# Patient Record
Sex: FEMALE | Race: WHITE | URBAN - METROPOLITAN AREA
[De-identification: names, ages, dates, MRNs, and addresses within clinical notes are randomized per-mention and may not be internally consistent; named-entity substitution may affect disease eponyms.]

---

## 2018-10-16 ENCOUNTER — TRANSFERRED RECORDS (OUTPATIENT)
Dept: HEALTH INFORMATION MANAGEMENT | Facility: CLINIC | Age: 83
End: 2018-10-16

## 2018-10-17 ENCOUNTER — TRANSFERRED RECORDS (OUTPATIENT)
Dept: HEALTH INFORMATION MANAGEMENT | Facility: CLINIC | Age: 83
End: 2018-10-17

## 2018-10-23 ENCOUNTER — TELEPHONE (OUTPATIENT)
Dept: GASTROENTEROLOGY | Facility: CLINIC | Age: 83
End: 2018-10-23

## 2018-10-23 NOTE — TELEPHONE ENCOUNTER
Called out to patient, received message requesting entering access code, unable to leave reminder for appointment 10/29/18 at 10AM.

## 2018-10-26 ENCOUNTER — DOCUMENTATION ONLY (OUTPATIENT)
Dept: GASTROENTEROLOGY | Facility: CLINIC | Age: 83
End: 2018-10-26

## 2018-10-28 ENCOUNTER — MEDICAL CORRESPONDENCE (OUTPATIENT)
Dept: HEALTH INFORMATION MANAGEMENT | Facility: CLINIC | Age: 83
End: 2018-10-28

## 2018-10-29 ENCOUNTER — OFFICE VISIT (OUTPATIENT)
Dept: GASTROENTEROLOGY | Facility: CLINIC | Age: 83
End: 2018-10-29
Payer: COMMERCIAL

## 2018-10-29 VITALS
TEMPERATURE: 97.7 F | SYSTOLIC BLOOD PRESSURE: 142 MMHG | DIASTOLIC BLOOD PRESSURE: 60 MMHG | OXYGEN SATURATION: 98 % | HEART RATE: 70 BPM

## 2018-10-29 DIAGNOSIS — R10.13 DYSPEPSIA: Primary | ICD-10-CM

## 2018-10-29 DIAGNOSIS — K21.9 GASTROESOPHAGEAL REFLUX DISEASE, ESOPHAGITIS PRESENCE NOT SPECIFIED: ICD-10-CM

## 2018-10-29 DIAGNOSIS — N18.30 CKD (CHRONIC KIDNEY DISEASE) STAGE 3, GFR 30-59 ML/MIN (H): ICD-10-CM

## 2018-10-29 DIAGNOSIS — R60.0 BILATERAL LOWER EXTREMITY EDEMA: ICD-10-CM

## 2018-10-29 RX ORDER — NICOTINE POLACRILEX 4 MG/1
40 GUM, CHEWING ORAL 2 TIMES DAILY
Qty: 60 TABLET | Refills: 1 | Status: SHIPPED | OUTPATIENT
Start: 2018-10-29 | End: 2018-11-05

## 2018-10-29 RX ORDER — NICOTINE POLACRILEX 4 MG/1
40 GUM, CHEWING ORAL DAILY
Qty: 90 TABLET | Refills: 3 | Status: SHIPPED | OUTPATIENT
Start: 2018-12-17 | End: 2018-11-05

## 2018-10-29 ASSESSMENT — ENCOUNTER SYMPTOMS
SLEEP DISTURBANCES DUE TO BREATHING: 0
VOMITING: 1
ORTHOPNEA: 1
BACK PAIN: 0
NAUSEA: 1
SWOLLEN GLANDS: 0
SYNCOPE: 0
BOWEL INCONTINENCE: 0
MUSCLE WEAKNESS: 0
MUSCLE CRAMPS: 0
PALPITATIONS: 0
BLOOD IN STOOL: 0
BLOATING: 0
BRUISES/BLEEDS EASILY: 0
STIFFNESS: 1
ARTHRALGIAS: 1
JAUNDICE: 0
HYPOTENSION: 0
EXERCISE INTOLERANCE: 1
JOINT SWELLING: 0
ABDOMINAL PAIN: 1
CONSTIPATION: 0
MYALGIAS: 0
HYPERTENSION: 1
LEG PAIN: 1
HEARTBURN: 1
RECTAL PAIN: 0
NECK PAIN: 0
LIGHT-HEADEDNESS: 0
DIARRHEA: 0

## 2018-10-29 ASSESSMENT — PAIN SCALES - GENERAL: PAINLEVEL: NO PAIN (0)

## 2018-10-29 NOTE — PATIENT INSTRUCTIONS
I've included a brief summary of our discussion and care plan from today's visit below.  Please review this information with your primary care provider.  _______________________________________________________________________      1. It was wonderful getting a chance to meet with you to discuss your Chronic (likely multifactorial) Abdominal Pain in the complicated post-surgical setting, particularly given the recent initiation of narcotic pain medication (Fentanyl patch) which has helped - discussed next steps in evaluation and management together today.  - Given the improvement in your symptoms with use of as-needed Maalox (currently holding off use due to diarrhea) and low-dose PPI per the Nursing Home notes, would advise increasing your PPI (omeprazole) dosage as I suspect an element of Dyspepsia +/- PUD at play. Would advise trying omeprazole 40mg, taken twice daily, for 8 weeks and then reducing to 40mg daily thereafter. This might help to reduce some of the dependence on Maalox. Recommend daily multivitamin supplementation if long-term acid suppression is required.  - Given your most recent CBC results from 10/16/18, I am unsure as to why you are on oral iron replacement (scant normocytic anemia in setting of multiple medical comorbidities including CKD). This can sometimes compound bloating/discomfort in setting of concurrent use of narcotics. Please discuss discontinuation of oral iron replacement for now with your care team, with a repeat CBC in 8 weeks to assess for interval recurrent ZAIDA (may resume oral replacement if so, or if needed in conjunction with CKD-associated EPO therapy).  - If your symptoms improve with adjustment in PPI, would advise working to taper off narcotic pain medication if at all possible (and would prefer your team use non-narcotic therapies if at all possible).     2. I am very concerned with the pattern of breathing and increased bilateral leg edema you've been experiencing.  -  Recommend follow-up with Cardiology (last seen in 2012) for evaluation, suspect some element of progression of small-vessel CAD at play. Would strongly advise consideration for repeat TTE to assess current cardiac function. May need to consider further pulmonary evaluation as well depending on findings.  - Would also advise follow-up with Nephrology (last seen in 2012) given the underlying CKD and worsened bilateral leg edema, currently on very low-dose Lasix.  - Recommend Lymphedema Clinic referral to help with symptomatic management of your bilateral leg edema in the meantime, particularly given the current issues with significant uncomfortable beaded compression with your current stocking (risk for discomfort/skin tearing).  - Would recommend symptomatic management for suspected Dyspepsia +/- PUD in the meantime, hold off endoscopic evaluation until other active medical issues are stabilized. Can readdress role for endoscopic evaluations in ongoing care.    3. Evidence to suggest underlying chronic liver disease (assuming liver nodularity without overt mass on non-contrast outside CT scan 10/17/18, mild thrombocytopenia on CBC profile), and likely this is not a new phenomenon.  - Recommend obtaining a Complete Abdominal Ultrasound + Doppler study through your home institution to confirm, and to rule out a liver mass/ascites.  - May require consideration for referral to [facility] Hepatology for ongoing chronic liver disease management, particularly as chronic liver disease could also be playing a role in your worsening leg edema.    4. Continue to monitor for the danger signs/symptoms we reviewed together today:  worsening abdominal pain, worsening diarrhea, obstructive symptoms (nausea/vomiting, abdominal distention, inability to pass stool/flatus), blood mixed into stools, persistent fevers/chills, progressive anemia (particularly with iron deficiency), difficulty with swallowing, perianal/rectal discomfort  (particularly with defecation), unexpected weight loss, etc.  - Contact us via [employer] or phone should these symptoms occur, particularly as we may advise further evaluation accordingly.    5. Return to GI Clinic with my GI Physician Assistants (Roberto or Meghna) in 3-4 months to review your progress, sooner if symptomatic.   - If you are unable to schedule this follow-up appointment today, please contact our  at [phone] within the next week to help set up this necessary appointment.    _______________________________________________________________________    It was a pleasure seeing you in clinic today - please be in touch if there are any further questions that arise following today's visit.  During business hours, you may reach Clinic Nurse Triage Line at [phone].  For urgent/emergent questions after business hours, you may reach the on-call GI Fellow by contacting the [facility]  at [phone].    Any benign/non-urgent test results are usually communicated via letter or [employer] message within 1-2 weeks after completion.  Urgent results (those that require a change in the previously-discussed care plan) are usually communicated via a phone call once available from our clinic staff to discuss the results and the next steps in your evaluation.    I recommend signing up for [employer] access if you have not already done so and are comfortable with using a computer.  This allows for online access to your lab results and also helps you communicate efficiently with my clinic should any questions arise in your care.    We have Financial Counseling services available through our clinic.  If you have questions about your insurance coverage or payment responsibilities, particularly before you undergo any tests or procedures, please let us know and we can arrange a consultation accordingly to help you make informed decisions about your healthcare.    Sincerely,    Etienne FOUNTAIN  MD Rishabh    [facility] - Department of Medicine  Division of Gastroenterology

## 2018-10-29 NOTE — NURSING NOTE
Chief Complaint   Patient presents with     New Patient     new patient consult, abdominal pain       Vitals:    10/29/18 1013   BP: 142/60   Pulse: 70   Temp: 97.7  F (36.5  C)   TempSrc: Oral   SpO2: 98%       There is no height or weight on file to calculate BMI.    ROLY Mcintosh

## 2018-10-29 NOTE — LETTER
"10/29/2018       RE: Maryse Cruz  [address]  [place] [address]     Dear Colleague,    Thank you for referring your patient, Maryse Cruz, to the [facility] at [facility]. Please see a copy of my visit note below.    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 History is obtained through the patient and her two daughters. Patient was seen by outside provider for this complaint in which she had a CT scan notable for cirrhosis, and so acute changes of esophagus or intestine. She had EGD planned which was cancelled due to medical co-morbidities with plan for evaluation through [facility]. She reports abdominal pain described as a \"nervous stomach\" in her upper abdomen beginning about a month ago. Pain is improved with eating however is worsened with greasy foods. She had been taking Maalox through her nursing home, and used a fentanyl patch which improved her symptoms. She was also started on 20 mg PPI daily and is unclear if this affected her symptoms. She has had a couple of episodes of vomiting, most recently twice yesterday (once after eating, and once in the evening). Her daughters also note increased gas in ostomy and describes output as normal green/brown and soft. Notes increased swelling in legs the past month. Also has difficulty breathing. She denies heart-burn, hematochezia, melena, hematemesis. Reports having 2-3 appliance empties/day, typically w/ brown soft stool, no evidence of melena/BRB via ostomy. Denies any tenesmus or insecurity passing flatus, no fecal incontinence or " new perianal/nocturnal sxs noted. Denies any N/V/F/C/HA/NS or other const/syst/cardiopulmonary sxs, no urinary changes, no unintentional wt loss or appetite/satiety changes. No other bowel/bladder habit changes, no dysphagia/odynophagia. No jaundice/icterus/pruritus, no acholic stools/steatorrhea, no new lumps/bumps, no jt pain/oral ulcer/rash/eye sxs noted.    ROS: 10pt ROS performed and otherwise negative.    PERTINENT PAST MEDICAL HISTORY:  As noted above.    PREVIOUS ABDOMINAL/GYNECOLOGIC SURGERIES:  Per HPI    PREVIOUS ENDOSCOPY:  Last endoscopy 2009- normal esophagus, stomach and duodenum     PERTINENT MEDICATIONS:  Omeprazole 20 mg daily   Medications reviewed with patient today, see Medication List/Assessment for details.  No other NSAID/anticoagulation reported by patient.  No other OTC/herbal/supplements reported by patient.    SOCIAL HISTORY:  Lives in a nursing home, no smoking, drinking or drug use     FAMILY HISTORY:  Mother with CRC, Father with Liver cancer, uncle with colitis     PHYSICAL EXAMINATION:  Vitals reviewed, AFVSS  Wt- not obtained, patient in wheelchair   Gen: aaox3, cooperative, pleasant, not diaphoretic, nad  HEENT: ncat, neck supple, no clad/sclad, normal op w/o ulcer/exudate, anicteric, mmm  Resp/CV labored breathing, use of accessory muscles for breathing   Abd: midline hernia and dahiana-ostomy hernia, ostomy located left mid-abdomen, areas of firm abdomen, slightly tender to palpation   Ext: 2-3+ non-tender pitting edema in legs, no significant chronic venous stasis changes  Skin: warm, perfused, no jaundice  Neuro: grossly intact, no asterixis noted    PERTINENT STUDIES:  Lytes/LFT wnl, cr 1.6,   wbc 7.3, hgb 11.1, plt 123, mcv 97   Alk Phos 156 H   Calcium 9.2, Magnesium 2.2     ASSESSMENT/PLAN:    1. Upper Abdominal Pain in the setting of complicated abdominal surgery history and medical co-morbidities   Source of abdominal pain likely multifactorial and may be caused by GERD,  PUD, or functional dyspepsia.  Most recent labs through outside clinic notable for decreased HgB at 11/1 and normal MCV. CT scan did not show changes concerning for active Crohn's disease or source of abdominal pain however she was found to have cirrhosis. Symptoms improved with Maalox and improves with eating. She has been stated on PPI therapy, which will be optimized with omeprazole 40 mg BID for 8 weeks with plan to reduce to 40 mg after. She has complicated medical commodities including CAD and HTN in addition to CKD which would make EGD to evaluate for source of abdominal pain difficult. We recommend that the patient follows with nephrology and cardiology regarding other health issues as  she has not been seen by them since 2012. She also has significant LE for which we recommend lymphedema. Regarding finding of cirrhosis on CT scan, we would recommend a complete abdominal ultrasound and doppler study with possible referral to our hepatology clinic pending results.  Pending response of symptoms to PPI and evaluation by nephrology and cardiology, could consider EGD at follow-up visit.      Cancer Screening: with change in symptoms due to complicated medical commodities and age     RTC 3-4 months, sooner if symptomatic.    Thank you for this consultation. It was a pleasure to participate in the care of this patient; please contact us with any further questions.    Meghna Comer PA-C  Division of Gastroenterology, Hepatology & Nutrition  [facility]    ATTENDING ATTESTATION:    REFERRING PROVIDER: Self-referred  DATE SEEN: 10/29/18    1. It was wonderful getting a chance to meet with you to discuss your Chronic (likely multifactorial) Abdominal Pain in the complicated post-surgical setting, particularly given the recent initiation of narcotic pain medication (Fentanyl patch) which has helped - discussed next steps in evaluation and management together today.  - Given the improvement in your  symptoms with use of as-needed Maalox (currently holding off use due to diarrhea) and low-dose PPI per the Nursing Home notes, would advise increasing your PPI (omeprazole) dosage as I suspect an element of Dyspepsia +/- PUD at play. Would advise trying omeprazole 40mg, taken twice daily, for 8 weeks and then reducing to 40mg daily thereafter. This might help to reduce some of the dependence on Maalox. Recommend daily multivitamin supplementation if long-term acid suppression is required.  - Given your most recent CBC results from 10/16/18, I am unsure as to why you are on oral iron replacement (scant normocytic anemia in setting of multiple medical comorbidities including CKD). This can sometimes compound bloating/discomfort in setting of concurrent use of narcotics. Please discuss discontinuation of oral iron replacement for now with your care team, with a repeat CBC in 8 weeks to assess for interval recurrent ZAIDA (may resume oral replacement if so, or if needed in conjunction with CKD-associated EPO therapy).  - If your symptoms improve with adjustment in PPI, would advise working to taper off narcotic pain medication if at all possible (and would prefer your team use non-narcotic therapies if at all possible).   - Patient preference to hold off invasive evaluation (particularly EGD) for now if possible, and I would tend to agree given the other comorbidities and lack of alarm features at play.    2. I am very concerned with the pattern of breathing and increased bilateral leg edema you've been experiencing.  - Recommend follow-up with Cardiology (last seen in 2012) for evaluation, suspect some element of progression of small-vessel CAD at play. Would strongly advise consideration for repeat TTE to assess current cardiac function. May need to consider further pulmonary evaluation as well depending on findings.  - Would also advise follow-up with Nephrology (last seen in 2012) given the underlying CKD and worsened  "bilateral leg edema, currently on very low-dose Lasix.  - Recommend Lymphedema Clinic referral to help with symptomatic management of your bilateral leg edema in the meantime, particularly given the current issues with significant uncomfortable beaded compression with your current stocking (risk for discomfort/skin tearing).  - Would recommend symptomatic management for suspected Dyspepsia +/- PUD in the meantime, hold off endoscopic evaluation until other active medical issues are stabilized. Can readdress role for endoscopic evaluations in ongoing care.    3. Evidence to suggest underlying chronic liver disease (assuming liver nodularity without overt mass on non-contrast outside CT scan 10/17/18 though awaiting image upload, mild thrombocytopenia on CBC profile), and likely this is not a new phenomenon. History of \"fatty liver\" per patient/family, suspect NAFLD/ARREDONDO etiology.  - Recommend obtaining a Complete Abdominal Ultrasound + Doppler study through your home institution to confirm, and to rule out a liver mass/ascites.  - May require consideration for referral to [facility]atology for ongoing chronic liver disease management, particularly as chronic liver disease could also be playing a role in your worsening leg edema. Can readdress role for EV surveillance with EGD as part of ongoing care once cardiopulmonary and fluid status has been stabilized.    4. Continue to monitor for the danger signs/symptoms we reviewed together today:  worsening abdominal pain, worsening diarrhea, obstructive symptoms (nausea/vomiting, abdominal distention, inability to pass stool/flatus), blood mixed into stools, persistent fevers/chills, progressive anemia (particularly with iron deficiency), difficulty with swallowing, perianal/rectal discomfort (particularly with defecation), unexpected weight loss, etc.  - Contact us via MyChart or phone should these symptoms occur, particularly as we may advise further evaluation " accordingly.    5. Return to GI Clinic with my GI Physician Assistants (Roberto or Meghna) in 3-4 months to review your progress, sooner if symptomatic.     Thank you for this consultation. It was a pleasure to participate in the care of this patient; please contact us with any further questions.    Patient was discussed, seen, and examined by me, Etienne Keating. The plan of care and pertinent data/imaging were also reviewed with the GI PA in clinic today. Agree with the joint assessment and plan as delineated above.    Please contact me with any further questions.    Etienne Keating MD    [facility] - Department of Medicine  Division of Gastroenterology

## 2018-10-29 NOTE — MR AVS SNAPSHOT
After Visit Summary   10/29/2018    Maryse Cruz    MRN: [id number]           Patient Information     Date Of Birth          9/10/1930        Visit Information        Provider Department      10/29/2018 10:00 AM Etienne Keating MD [facility]        Today's Diagnoses     Bilateral lower extremity edema    -  1    Gastroesophageal reflux disease with esophagitis          Care Instructions    I've included a brief summary of our discussion and care plan from today's visit below.  Please review this information with your primary care provider.  _______________________________________________________________________      1. It was wonderful getting a chance to meet with you to discuss your Chronic (likely multifactorial) Abdominal Pain in the complicated post-surgical setting, particularly given the recent initiation of narcotic pain medication (Fentanyl patch) which has helped - discussed next steps in evaluation and management together today.  - Given the improvement in your symptoms with use of as-needed Maalox (currently holding off use due to diarrhea) and low-dose PPI per the Nursing Home notes, would advise increasing your PPI (omeprazole) dosage as I suspect an element of Dyspepsia +/- PUD at play. Would advise trying omeprazole 40mg, taken twice daily, for 8 weeks and then reducing to 40mg daily thereafter. This might help to reduce some of the dependence on Maalox. Recommend daily multivitamin supplementation if long-term acid suppression is required.  - Given your most recent CBC results from 10/16/18, I am unsure as to why you are on oral iron replacement (scant normocytic anemia in setting of multiple medical comorbidities including CKD). This can sometimes compound bloating/discomfort in setting of concurrent use of narcotics. Please discuss discontinuation of oral iron replacement for now with your care team, with a repeat CBC in 8 weeks to assess for  interval recurrent ZAIDA (may resume oral replacement if so, or if needed in conjunction with CKD-associated EPO therapy).  - If your symptoms improve with adjustment in PPI, would advise working to taper off narcotic pain medication if at all possible (and would prefer your team use non-narcotic therapies if at all possible).     2. I am very concerned with the pattern of breathing and increased bilateral leg edema you've been experiencing.  - Recommend follow-up with Cardiology (last seen in 2012) for evaluation, suspect some element of progression of small-vessel CAD at play. Would strongly advise consideration for repeat TTE to assess current cardiac function. May need to consider further pulmonary evaluation as well depending on findings.  - Would also advise follow-up with Nephrology (last seen in 2012) given the underlying CKD and worsened bilateral leg edema, currently on very low-dose Lasix.  - Recommend Lymphedema Clinic referral to help with symptomatic management of your bilateral leg edema in the meantime, particularly given the current issues with significant uncomfortable beaded compression with your current stocking (risk for discomfort/skin tearing).  - Would recommend symptomatic management for suspected Dyspepsia +/- PUD in the meantime, hold off endoscopic evaluation until other active medical issues are stabilized. Can readdress role for endoscopic evaluations in ongoing care.    3. Evidence to suggest underlying chronic liver disease (assuming liver nodularity without overt mass on non-contrast outside CT scan 10/17/18, mild thrombocytopenia on CBC profile), and likely this is not a new phenomenon.  - Recommend obtaining a Complete Abdominal Ultrasound + Doppler study through your home institution to confirm, and to rule out a liver mass/ascites.  - May require consideration for referral to [facility] for ongoing chronic liver disease management, particularly as chronic liver disease could  also be playing a role in your worsening leg edema.    4. Continue to monitor for the danger signs/symptoms we reviewed together today:  worsening abdominal pain, worsening diarrhea, obstructive symptoms (nausea/vomiting, abdominal distention, inability to pass stool/flatus), blood mixed into stools, persistent fevers/chills, progressive anemia (particularly with iron deficiency), difficulty with swallowing, perianal/rectal discomfort (particularly with defecation), unexpected weight loss, etc.  - Contact us via [employer] or phone should these symptoms occur, particularly as we may advise further evaluation accordingly.    5. Return to GI Clinic with my GI Physician Assistants (Roberto or Meghna) in 3-4 months to review your progress, sooner if symptomatic.   - If you are unable to schedule this follow-up appointment today, please contact our  at [phone] within the next week to help set up this necessary appointment.    _______________________________________________________________________    It was a pleasure seeing you in clinic today - please be in touch if there are any further questions that arise following today's visit.  During business hours, you may reach Clinic Nurse Triage Line at [phone].  For urgent/emergent questions after business hours, you may reach the on-call GI Fellow by contacting the [facility]  at [phone].    Any benign/non-urgent test results are usually communicated via letter or [employer] message within 1-2 weeks after completion.  Urgent results (those that require a change in the previously-discussed care plan) are usually communicated via a phone call once available from our clinic staff to discuss the results and the next steps in your evaluation.    I recommend signing up for [employer] access if you have not already done so and are comfortable with using a computer.  This allows for online access to your lab results and also helps you  communicate efficiently with my clinic should any questions arise in your care.    We have Financial Counseling services available through our clinic.  If you have questions about your insurance coverage or payment responsibilities, particularly before you undergo any tests or procedures, please let us know and we can arrange a consultation accordingly to help you make informed decisions about your healthcare.    Sincerely,    Etienne Keating MD    [facility] - Department of Medicine  Division of Gastroenterology            Follow-ups after your visit        Additional Services     LYMPHEDEMA THERAPY REFERRAL       If you have not heard from the scheduling office within 2 business days, please call [phone] for all locations, with the exception of [place], please call [phone] and [place], please call [phone].    Please be aware that coverage of these services is subject to the terms and limitations of your health insurance plan.  Call member services at your health plan with any benefit or coverage questions.                  Follow-up notes from your care team     Return in about 3 months (around 1/29/2019).      Future tests that were ordered for you today     Open Future Orders        Priority Expected Expires Ordered    LYMPHEDEMA THERAPY REFERRAL Routine  10/29/2019 10/29/2018            Who to contact     Please call your clinic at [phone] to:    Ask questions about your health    Make or cancel appointments    Discuss your medicines    Learn about your test results    Speak to your doctor            Additional Information About Your Visit        [employer] Information     [employer] is an electronic gateway that provides easy, online access to your medical records. With [employer], you can request a clinic appointment, read your test results, renew a prescription or communicate with your care team.     To sign up for [employer] visit the website at  [email or web address]   You will be asked to enter the access code listed below, as well as some personal information. Please follow the directions to create your username and password.     Your access code is: [email or web address]  Expires: 2019  6:30 AM     Your access code will  in 90 days. If you need help or a new code, please contact your [facility] or call [phone] for assistance.        Care EveryWhere ID     This is your Care EveryWhere ID. This could be used by other organizations to access your [place] medical records  [id number]        Your Vitals Were     Pulse Temperature Pulse Oximetry             70 97.7  F (36.5  C) (Oral) 98%          Blood Pressure from Last 3 Encounters:   10/29/18 142/60   10/26/12 105/49   12 112/57    Weight from Last 3 Encounters:   10/26/12 90.9 kg (200 lb 4.8 oz)   12 93.5 kg (206 lb 3.2 oz)   06/15/12 95.8 kg (211 lb 3.2 oz)                 Today's Medication Changes          These changes are accurate as of 10/29/18 11:19 AM.  If you have any questions, ask your nurse or doctor.               Start taking these medicines.        Dose/Directions    * omeprazole 20 MG tablet   Used for:  Gastroesophageal reflux disease with esophagitis   Started by:  Etienne Keating MD        Dose:  40 mg   Take 2 tablets (40 mg) by mouth 2 times daily For 8 weeks.   Quantity:  60 tablet   Refills:  1       * omeprazole 20 MG tablet   Used for:  Gastroesophageal reflux disease with esophagitis   Started by:  Etienne Keating MD        Dose:  40 mg   Start taking on:  2018   Take 2 tablets (40 mg) by mouth daily   Quantity:  90 tablet   Refills:  3       * Notice:  This list has 2 medication(s) that are the same as other medications prescribed for you. Read the directions carefully, and ask your doctor or other care provider to review them with you.         Where to get your medicines      Some of these will need a paper  prescription and others can be bought over the counter.  Ask your nurse if you have questions.     Bring a paper prescription for each of these medications     omeprazole 20 MG tablet    omeprazole 20 MG tablet                Primary Care Provider Office Phone # Fax #    [facility] [phone] [address] [facility] [address]        Equal Access to Services     THEODORE WILLIAM : Hadii ирина ku hadasho Soomaali, waaxda luqadaha, qaybta kaalmada adejaneeyada, maddy tam carolveronica funeteskristindenise andrew. So [facility] [phone].    ATENCIÓN: Si habla español, tiene a dyer disposición servicios gratuitos de asistencia lingüística. Llame al [phone].    We comply with applicable federal civil rights laws and [place] laws. We do not discriminate on the basis of race, color, national origin, age, disability, sex, sexual orientation, or gender identity.            Thank you!     Thank you for choosing [facility]  for your care. Our goal is always to provide you with excellent care. Hearing back from our patients is one way we can continue to improve our services. Please take a few minutes to complete the written survey that you may receive in the mail after your visit with us. Thank you!             Your Updated Medication List - Protect others around you: Learn how to safely use, store and throw away your medicines at www.disposemymeds.org.          This list is accurate as of 10/29/18 11:19 AM.  Always use your most recent med list.                   Brand Name Dispense Instructions for use Diagnosis    ACTOS 15 MG tablet   Generic drug:  pioglitazone      Take 15 mg by mouth daily.        albuterol (2.5 MG/3ML) 0.083% neb solution      Take 1 ampule by nebulization every 4 hours as needed.        allopurinol 300 MG tablet    ZYLOPRIM     Take 150 mg by mouth daily.        AMBIEN 5 MG tablet   Generic drug:  zolpidem      Take 5 mg by mouth nightly as needed.  "       amitriptyline 25 MG tablet    ELAVIL     Take 25 mg by mouth At Bedtime.        aspirin 325 MG tablet      Take 325 mg by mouth daily.        atenolol 50 MG tablet    TENORMIN     Take 50 mg by mouth daily.        bisacodyl 5 MG EC tablet    DULCOLAX    4 tablet    Take 4 tablets by mouth. Refer to \"Getting Ready for a Colonoscopy\" patient handout    Special screening for malignant neoplasms, colon       calcitRIOL 0.5 MCG capsule    ROCALTROL    90 capsule    Take 1 capsule by mouth daily.    Vitamin D deficiency       COZAAR 100 MG tablet   Generic drug:  losartan      Take 100 mg by mouth daily.        EVISTA 60 MG tablet   Generic drug:  raloxifene      Take 60 mg by mouth daily.        ferrous sulfate 325 (65 Fe) MG tablet    IRON     Take 1 tablet by mouth daily.        * furosemide 20 MG tablet    LASIX    90 tablet    Take  by mouth 2 times daily. Take 40 MG every AM and 20 MG every PM    CAD (coronary artery disease)       * furosemide 20 MG tablet    LASIX    90 tablet    Take 2 tablets by mouth 2 times daily.    Chronic kidney disease, unspecified, Edema       GLUCOPHAGE 500 MG tablet   Generic drug:  metFORMIN      Take 500 mg by mouth 2 times daily (with meals).        GLUCOTROL 10 MG tablet   Generic drug:  glipiZIDE      Take 10 mg by mouth 2 times daily (before meals).        HYDROcodone-acetaminophen 5-500 MG per tablet    VICODIN     Take 1 tablet by mouth 3 times daily as needed.        isosorbide dinitrate 20 MG tablet    ISORDIL     Take 20 mg by mouth 3 times daily.        LANTUS SOLOSTAR 100 UNIT/ML injection   Generic drug:  insulin glargine      Inject 50 Units Subcutaneous At Bedtime.        LIPITOR 10 MG tablet   Generic drug:  atorvastatin      Take 10 mg by mouth daily.        lisinopril 20 MG tablet    PRINIVIL/ZESTRIL     Take 20 mg by mouth daily.        LOMOTIL 2.5-0.025 MG per tablet   Generic drug:  diphenoxylate-atropine      Take 2 tablets by mouth 4 times daily as needed. "        loperamide 2 MG tablet    IMODIUM A-D     Take 3 tablets with first loose stool, then 1 tablet with each subsequent loose stool. Maximum of 8 tablets per 24 hours.        LOPID 600 MG tablet   Generic drug:  gemfibrozil      Take 600 mg by mouth 2 times daily (before meals).        NITROSTAT 0.4 MG sublingual tablet   Generic drug:  nitroGLYcerin      Place 0.4 mg under the tongue every 5 minutes as needed.        NovoLOG VIAL 100 UNITS/ML injection   Generic drug:  insulin aspart      Inject  Subcutaneous See Admin Instructions.        * omeprazole 20 MG tablet     60 tablet    Take 2 tablets (40 mg) by mouth 2 times daily For 8 weeks.    Gastroesophageal reflux disease with esophagitis       * omeprazole 20 MG tablet   Start taking on:  12/17/2018     90 tablet    Take 2 tablets (40 mg) by mouth daily    Gastroesophageal reflux disease with esophagitis       OSCAL 500/200 D-3 500-125 MG-UNIT Tabs   Generic drug:  calcium-vitamin D      Take 1 tablet by mouth 3 times daily.        prochlorperazine 5 MG tablet    COMPAZINE     Take 5-10 mg by mouth every 8 hours as needed.        ROBITUSSIN A-C OR      Take 5 mLs by mouth every 6 hours as needed.        TRICOR 145 MG tablet   Generic drug:  fenofibrate      Take 145 mg by mouth daily.        TYLENOL 325 MG tablet   Generic drug:  acetaminophen      Take 2 tablets by mouth 3 times daily.        ZANTAC 150 MG tablet   Generic drug:  ranitidine      Take 150 mg by mouth 2 times daily.        ZOLOFT 50 MG tablet   Generic drug:  sertraline      Take 50 mg by mouth daily.        * Notice:  This list has 4 medication(s) that are the same as other medications prescribed for you. Read the directions carefully, and ask your doctor or other care provider to review them with you.

## 2018-10-29 NOTE — NURSING NOTE
Patient received AVS discharge information. Information reviewed with patient, all patient questions answered and patient verbalized understanding of information received. Patient escorted to lobby    Patient signed a Authorization to discuss protected health information with her daughters. Form was scanned into her chart.

## 2018-10-31 ENCOUNTER — DOCUMENTATION ONLY (OUTPATIENT)
Dept: GASTROENTEROLOGY | Facility: CLINIC | Age: 83
End: 2018-10-31

## 2018-10-31 NOTE — PROGRESS NOTES
Received incoming fax for current medications taken at [facility] care facility, CT scan 10/17/18, and authorization to discuss health information; have been scanned into chart.

## 2018-10-31 NOTE — TELEPHONE ENCOUNTER
FUTURE VISIT INFORMATION:    Date: 11/05/18    Time: 1130    Location:  Cardiology  REFERRAL INFORMATION:    Referring provider:  Dr. Keating    Referring providers clinic:   [facility]    Reason for visit/diagnosis :  Lower Extremity Pain      Requested records from [facility]

## 2018-10-31 NOTE — PROGRESS NOTES
Received disc in mail of imaging from [facility], CT abdomen and pelvis 10/17/18; was brought to imaging dept to be entered into chart.

## 2018-11-05 ENCOUNTER — PRE VISIT (OUTPATIENT)
Dept: CARDIOLOGY | Facility: CLINIC | Age: 83
End: 2018-11-05

## 2018-11-05 ENCOUNTER — OFFICE VISIT (OUTPATIENT)
Dept: CARDIOLOGY | Facility: CLINIC | Age: 83
End: 2018-11-05
Attending: INTERNAL MEDICINE
Payer: COMMERCIAL

## 2018-11-05 ENCOUNTER — RADIANT APPOINTMENT (OUTPATIENT)
Dept: GENERAL RADIOLOGY | Facility: CLINIC | Age: 83
End: 2018-11-05
Attending: INTERNAL MEDICINE
Payer: COMMERCIAL

## 2018-11-05 VITALS
SYSTOLIC BLOOD PRESSURE: 147 MMHG | OXYGEN SATURATION: 98 % | BODY MASS INDEX: 39.51 KG/M2 | WEIGHT: 196 LBS | RESPIRATION RATE: 20 BRPM | DIASTOLIC BLOOD PRESSURE: 74 MMHG | HEIGHT: 59 IN | HEART RATE: 74 BPM

## 2018-11-05 DIAGNOSIS — I25.10 CORONARY ATHEROSCLEROSIS: ICD-10-CM

## 2018-11-05 DIAGNOSIS — D64.9 ANEMIA, UNSPECIFIED TYPE: ICD-10-CM

## 2018-11-05 DIAGNOSIS — R60.0 LOCALIZED EDEMA: ICD-10-CM

## 2018-11-05 DIAGNOSIS — N18.9 CHRONIC KIDNEY DISEASE, UNSPECIFIED CKD STAGE: Primary | ICD-10-CM

## 2018-11-05 DIAGNOSIS — I10 HYPERTENSION, UNSPECIFIED TYPE: ICD-10-CM

## 2018-11-05 DIAGNOSIS — N18.9 CHRONIC KIDNEY DISEASE, UNSPECIFIED CKD STAGE: ICD-10-CM

## 2018-11-05 DIAGNOSIS — I25.10 CORONARY ARTERY DISEASE INVOLVING NATIVE CORONARY ARTERY OF NATIVE HEART WITHOUT ANGINA PECTORIS: ICD-10-CM

## 2018-11-05 LAB — NT-PROBNP SERPL-MCNC: 1679 PG/ML (ref 0–450)

## 2018-11-05 PROCEDURE — 36415 COLL VENOUS BLD VENIPUNCTURE: CPT | Performed by: INTERNAL MEDICINE

## 2018-11-05 PROCEDURE — 99214 OFFICE O/P EST MOD 30 MIN: CPT | Mod: ZP | Performed by: INTERNAL MEDICINE

## 2018-11-05 PROCEDURE — 93010 ELECTROCARDIOGRAM REPORT: CPT | Mod: ZP | Performed by: INTERNAL MEDICINE

## 2018-11-05 PROCEDURE — G0463 HOSPITAL OUTPT CLINIC VISIT: HCPCS

## 2018-11-05 PROCEDURE — 83880 ASSAY OF NATRIURETIC PEPTIDE: CPT | Performed by: INTERNAL MEDICINE

## 2018-11-05 RX ORDER — MAGNESIUM OXIDE 400 MG/1
400 TABLET ORAL 2 TIMES DAILY
Qty: 7 TABLET
Start: 2018-11-05

## 2018-11-05 RX ORDER — NICOTINE POLACRILEX 4 MG/1
40 GUM, CHEWING ORAL 2 TIMES DAILY
Qty: 60 TABLET | Refills: 1
Start: 2018-11-05

## 2018-11-05 RX ORDER — GABAPENTIN 300 MG/1
300 CAPSULE ORAL 3 TIMES DAILY
Qty: 90 CAPSULE
Start: 2018-11-05

## 2018-11-05 RX ORDER — FOLIC ACID 1 MG/1
2 TABLET ORAL DAILY
Qty: 30 TABLET
Start: 2018-11-05

## 2018-11-05 RX ORDER — ONDANSETRON 4 MG/1
4 TABLET, FILM COATED ORAL EVERY 6 HOURS PRN
Qty: 18 TABLET
Start: 2018-11-05

## 2018-11-05 RX ORDER — POTASSIUM CHLORIDE 1500 MG/1
20 TABLET, EXTENDED RELEASE ORAL 2 TIMES DAILY
Qty: 60 TABLET
Start: 2018-11-05

## 2018-11-05 RX ORDER — ISOSORBIDE MONONITRATE 60 MG/1
60 TABLET, EXTENDED RELEASE ORAL DAILY
Qty: 30 TABLET
Start: 2018-11-05

## 2018-11-05 RX ORDER — FUROSEMIDE 20 MG
TABLET ORAL
Qty: 90 TABLET | Refills: 1
Start: 2018-11-05

## 2018-11-05 RX ORDER — DULOXETIN HYDROCHLORIDE 30 MG/1
30 CAPSULE, DELAYED RELEASE ORAL DAILY
Qty: 60 CAPSULE | Refills: 1
Start: 2018-11-05

## 2018-11-05 RX ORDER — MAGNESIUM HYDROXIDE/ALUMINUM HYDROXICE/SIMETHICONE 120; 1200; 1200 MG/30ML; MG/30ML; MG/30ML
15 SUSPENSION ORAL EVERY 4 HOURS PRN
Refills: 0
Start: 2018-11-05

## 2018-11-05 ASSESSMENT — PAIN SCALES - GENERAL: PAINLEVEL: NO PAIN (0)

## 2018-11-05 NOTE — PROGRESS NOTES
Referring provider:Etienne Keating MD    Chief complaint: LE edema    HPI: Ms. Maryse Cruz is a 88 year old  female with PMH significant for HTN, CAD, DM, CKD.    The patient is referred by  for further evaluation of LE edema and dyspnea detected during recent visit on 10/29. She was seen by  for abdominal pain and nodularity in liver.     She is accompanied by her 2 daughters today which provided most of the history. The patient herself lives at a nursing home over the last 18 months and her daughters has noticed decreased mobility and weight gain.  They have noticed that her shortness of breath on exertion has gotten worse over the last few months.  She has a hx of lower extremity edema over the last few years but over the last 1 or 2 months lower extremity edema has gotten worse. She uses stockings for her lower extremity edema. The patient denies PND or orthopnea. She reports feeling dizzy from time to time. She sometimes feels palpitations.  No syncope.She sleeps in a recliner over the last 25 years.    The patient was last seen in cardiology in 2012  for CAD. She has a hx of coronary angiogram in 1993 and in 1997 which showed occluded right coronary artery. An attempted intervention was unsuccessful. Last stress test in 2008 showed normal perfusion and normal EF.  Current medications include   mg, atenolol 50 mg, fenofibrate 145 mg, furosemide 20 mg bid, insulin and allopurinol.  The patient's risk factor profile is: (-) HTN, (-) diabetes, (-) hyperlipidemia, (-) tobacco use, (-) family Hx CAD.     I have reviewed her ECG in clinic today which shows normal sinus rhythm and normal otherwise.    Medications, personal, family, and social history reviewed with patient and revised.    PAST MEDICAL HISTORY:  Past Medical History:   Diagnosis Date     Anemia, unspecified      Carpal tunnel syndrome      CKD (chronic kidney disease) stage 3, GFR 30-59 ml/min (H)      Coronary  "atherosclerosis of unspecified type of vessel, native or graft      Crohn's     colostomy d/t crohns     Dermatophytosis of nail      Diabetes mellitus (H)      DJD (degenerative joint disease), lumbar      Fatty liver      Hepatomegaly      Hypertension      Ileostomy status (H)      Myalgia and myositis, unspecified      Obesity, unspecified      Osteoarthritis of hip      Peristomal hernia      Pure hypercholesterolemia      Radiculopathy of cervical spine      Weakness of right hand        CURRENT MEDICATIONS:  Current Outpatient Prescriptions   Medication Sig Dispense Refill     acetaminophen (TYLENOL) 325 MG tablet Take 2 tablets by mouth 3 times daily.       albuterol (2.5 MG/3ML) 0.083% nebulizer solution Take 1 ampule by nebulization every 4 hours as needed.       allopurinol (ZYLOPRIM) 300 MG tablet Take 150 mg by mouth daily.       amitriptyline (ELAVIL) 25 MG tablet Take 25 mg by mouth At Bedtime.       aspirin 325 MG tablet Take 325 mg by mouth daily.       atenolol (TENORMIN) 50 MG tablet Take 50 mg by mouth daily.       atorvastatin (LIPITOR) 10 MG tablet Take 10 mg by mouth daily.       bisacodyl (DULCOLAX) 5 MG EC tablet Take 4 tablets by mouth. Refer to \"Getting Ready for a Colonoscopy\" patient handout 4 tablet 0     calcitRIOL (ROCALTROL) 0.5 MCG capsule Take 1 capsule by mouth daily. 90 capsule 1     Calcium-Vitamin D (OSCAL 500/200 D-3) 500-125 MG-UNIT TABS Take 1 tablet by mouth 3 times daily.       diphenoxylate-atropine (LOMOTIL) 2.5-0.025 MG per tablet Take 2 tablets by mouth 4 times daily as needed.       fenofibrate (TRICOR) 145 MG tablet Take 145 mg by mouth daily.       ferrous sulfate 325 (65 FE) MG tablet Take 1 tablet by mouth daily.       Furosemide (LASIX) 20 MG tablet Take 2 tablets by mouth 2 times daily. 90 tablet 1     Furosemide (LASIX) 20 MG tablet Take  by mouth 2 times daily. Take 40 MG every AM and 20 MG every PM 90 tablet 11     gemfibrozil (LOPID) 600 MG tablet Take 600 " mg by mouth 2 times daily (before meals).       glipiZIDE (GLUCOTROL) 10 MG tablet Take 10 mg by mouth 2 times daily (before meals).       Guaifenesin-Codeine (ROBITUSSIN A-C OR) Take 5 mLs by mouth every 6 hours as needed.       hydrocodone-acetaminophen (VICODIN) 5-500 MG per tablet Take 1 tablet by mouth 3 times daily as needed.       insulin aspart (NOVOLOG) injection  Inject  Subcutaneous See Admin Instructions.       insulin glargine (LANTUS SOLOSTAR) 100 UNIT/ML injection Inject 50 Units Subcutaneous At Bedtime.       isosorbide dinitrate (ISORDIL) 20 MG tablet Take 20 mg by mouth 3 times daily.       lisinopril (PRINIVIL,ZESTRIL) 20 MG tablet Take 20 mg by mouth daily.       loperamide (IMODIUM A-D) 2 MG tablet Take 3 tablets with first loose stool, then 1 tablet with each subsequent loose stool. Maximum of 8 tablets per 24 hours.       losartan (COZAAR) 100 MG tablet Take 100 mg by mouth daily.       MetFORmin (GLUCOPHAGE) 500 MG tablet Take 500 mg by mouth 2 times daily (with meals).       nitroGLYCERIN (NITROSTAT) 0.4 MG SL tablet Place 0.4 mg under the tongue every 5 minutes as needed.       omeprazole 20 MG tablet Take 2 tablets (40 mg) by mouth 2 times daily For 8 weeks. 60 tablet 1     [START ON 12/17/2018] omeprazole 20 MG tablet Take 2 tablets (40 mg) by mouth daily 90 tablet 3     pioglitazone (ACTOS) 15 MG tablet Take 15 mg by mouth daily.       prochlorperazine (COMPAZINE) 5 MG tablet Take 5-10 mg by mouth every 8 hours as needed.       raloxifene (EVISTA) 60 MG tablet Take 60 mg by mouth daily.       ranitidine (ZANTAC) 150 MG tablet Take 150 mg by mouth 2 times daily.       sertraline (ZOLOFT) 50 MG tablet Take 50 mg by mouth daily.       zolpidem (AMBIEN) 5 MG tablet Take 5 mg by mouth nightly as needed.         PAST SURGICAL HISTORY:  Past Surgical History:   Procedure Laterality Date     APPENDECTOMY       COLOSTOMY  1999     GYN SURGERY      csection x2     parastomal hernia repair[  1999  "      ALLERGIES:   No Known Allergies    FAMILY HISTORY:  No family history on file.      SOCIAL HISTORY:  Social History   Substance Use Topics     Smoking status: Never Smoker     Smokeless tobacco: Never Used     Alcohol use No       ROS:   Constitutional: No fever, chills, or sweats. Weight stable.   ENT: No visual disturbance, ear ache, epistaxis, sore throat.   Cardiovascular: As per HPI.   Respiratory: No cough, hemoptysis.    GI: No nausea, vomiting, hematemesis, melena, or hematochezia. Abdominal pain +  : No hematuria.   Integument: Negative.   Psychiatric: Negative.   Hematologic:  No easy bruising, no easy bleeding.  Neuro: Negative.   Endocrinology: No significant heat or cold intolerance   Musculoskeletal: No myalgia.    Exam:  /74 (BP Location: Right arm, Patient Position: Sitting, Cuff Size: Adult Large)  Pulse 74  Resp 20  Ht 1.499 m (4' 11\")  Wt 88.9 kg (196 lb)  SpO2 98%  BMI 39.59 kg/m2   The patient was examined while she was sitting in her wheelchair.  GENERAL APPEARANCE: alert and no distress  HEENT: no icterus, no central cyanosis  LYMPH/NECK: no adenopathy, no asymmetry, JVP not elevated in sitting position, no carotid bruits.  RESPIRATORY: lungs clear to auscultation - no rales, rhonchi or wheezes, no use of accessory muscles, no retractions, respirations are unlabored, normal respiratory rate  CARDIOVASCULAR: regular rhythm, normal S1, S2, no S3 or S4 and no murmur, click or rub, precordium quiet with normal PMI.  GI: soft, non tender  EXTREMITIES: peripheral pulses normal, bilateral 2+ pretibial edema.  NEURO: alert, normal speech,and affect  VASC: Radial, dorsalis pedis and posterior tibialis pulses are normal in volumes and symmetric bilaterally.   SKIN: no ecchymoses, no rashes     I have reviewed the labs and personally reviewed the imaging below and made my comment in the assessment and plan.    Labs:  CBC RESULTS 5/2/2017 [facility]:   Hemoglobin 10.6  White blood " cell 9.4K  Platelet 106K       BMP RESULTS 5/2/2017 [facility]:  Sodium 140  Potassium 4.1  Glucose 83  BUN 33  Creatinine 1.46    TSH 5/25/2016 2.96    Echocardiogram 2012 [facility]   Normal left ventricular size and systolic function, estimated ejection   fraction of 55-60%.    Left atrial enlargement.  Mild mitral regurgitation with mitral annular calcification.    Aortic valve sclerosis.  Mild tricuspid regurgitation with pulmonary hypertension with estimated   right ventricular systolic pressure of 40mmHg plus right atrial pressure.  Possible PFO.    EKG from today was completed while she was sitting in the chair.  Sinus rhythm otherwise unremarkable.    Assessment and Plan:   Ms. Maryse Cruz is a 88 year old  female with PMH significant for HTN, CAD, DM and CKD.    1.  Lower extremity edema with increased shortness of breath on exertion over the last few months: These symptoms might be due to heart failure with preserved ejection fraction.  She does not report PND or orthopnea since she is sleeping on a recliner.  I recommend a chest x-ray, BNP and transthoracic echocardiogram.  I recommended to increase furosemide to 40 mg in the a.m. and 20 mg in the p.m.  Recommended low-sodium diet.  Otherwise continue current medications.  I will see the patient in 1 month to review the results and evaluate lower extremity edema.    2.  Coronary artery disease: The patient has a remote history of stenting of RCA.  No recent angiogram.  She denies chest pain.    3.  Hypertension: Well-controlled.      4.  Anemia: I will defer the follow-up to primary care physician.  Hemoglobin was 10.7 from 2017 likely due to CKD.    Furosemide dose increased to 40+20 mg.  Continue current medical treatment with;  Atenolol 50 mg  Isosorbide mononitrate 60 mg  Insulin  Inhalers    Return to clinic in 1 month.    A total of 30 minutes spent face-toface with greater than 50% of the time spent in cpunseling and coordinating cares  of the issues above.   Please donot hesitate to contact me if you have any questions or concerns. Again, thank you for allowing me to participate in the care of your patient.  Abe DEMPSEY MD  [facility] Division of Cardiology  Pager [phone]

## 2018-11-05 NOTE — NURSING NOTE
Chief Complaint   Patient presents with     Consult      87 y/o, hx of CAD, for evaluation of new onset edema     Coleman Tolliver CMA at 11:42 AM on 11/5/2018     Medications and vitals reviewed with patient and confirmed.

## 2018-11-05 NOTE — LETTER
11/5/2018      RE: Maryse Cruz  [address]  [place] [address]       Dear Colleague,    Thank you for the opportunity to participate in the care of your patient, Maryse Cruz, at the [facility]. Please see a copy of my visit note below.    Referring provider:Etienne Keating MD    Chief complaint: LE edema    HPI: Ms. Maryse Cruz is a 88 year old  female with PMH significant for HTN, CAD, DM, CKD.    The patient is referred by  for further evaluation of LE edema and dyspnea detected during recent visit on 10/29. She was seen by  for abdominal pain and nodularity in liver.     She is accompanied by her 2 daughters today which provided most of the history. The patient herself lives at a nursing home over the last 18 months and her daughters has noticed decreased mobility and weight gain.  They have noticed that her shortness of breath on exertion has gotten worse over the last few months.  She has a hx of lower extremity edema over the last few years but over the last 1 or 2 months lower extremity edema has gotten worse. She uses stockings for her lower extremity edema. The patient denies PND or orthopnea. She reports feeling dizzy from time to time. She sometimes feels palpitations.  No syncope.She sleeps in a recliner over the last 25 years.    The patient was last seen in cardiology in 2012  for CAD. She has a hx of coronary angiogram in 1993 and in 1997 which showed occluded right coronary artery. An attempted intervention was unsuccessful. Last stress test in 2008 showed normal perfusion and normal EF.  Current medications include   mg, atenolol 50 mg, fenofibrate 145 mg, furosemide 20 mg bid, insulin and allopurinol.  The patient's risk factor profile is: (-) HTN, (-) diabetes, (-) hyperlipidemia, (-) tobacco use, (-) family Hx CAD.     I have reviewed her ECG in clinic today which shows normal sinus  "rhythm and normal otherwise.    Medications, personal, family, and social history reviewed with patient and revised.    PAST MEDICAL HISTORY:  Past Medical History:   Diagnosis Date     Anemia, unspecified      Carpal tunnel syndrome      CKD (chronic kidney disease) stage 3, GFR 30-59 ml/min (H)      Coronary atherosclerosis of unspecified type of vessel, native or graft      Crohn's     colostomy d/t crohns     Dermatophytosis of nail      Diabetes mellitus (H)      DJD (degenerative joint disease), lumbar      Fatty liver      Hepatomegaly      Hypertension      Ileostomy status (H)      Myalgia and myositis, unspecified      Obesity, unspecified      Osteoarthritis of hip      Peristomal hernia      Pure hypercholesterolemia      Radiculopathy of cervical spine      Weakness of right hand        CURRENT MEDICATIONS:  Current Outpatient Prescriptions   Medication Sig Dispense Refill     acetaminophen (TYLENOL) 325 MG tablet Take 2 tablets by mouth 3 times daily.       albuterol (2.5 MG/3ML) 0.083% nebulizer solution Take 1 ampule by nebulization every 4 hours as needed.       allopurinol (ZYLOPRIM) 300 MG tablet Take 150 mg by mouth daily.       amitriptyline (ELAVIL) 25 MG tablet Take 25 mg by mouth At Bedtime.       aspirin 325 MG tablet Take 325 mg by mouth daily.       atenolol (TENORMIN) 50 MG tablet Take 50 mg by mouth daily.       atorvastatin (LIPITOR) 10 MG tablet Take 10 mg by mouth daily.       bisacodyl (DULCOLAX) 5 MG EC tablet Take 4 tablets by mouth. Refer to \"Getting Ready for a Colonoscopy\" patient handout 4 tablet 0     calcitRIOL (ROCALTROL) 0.5 MCG capsule Take 1 capsule by mouth daily. 90 capsule 1     Calcium-Vitamin D (OSCAL 500/200 D-3) 500-125 MG-UNIT TABS Take 1 tablet by mouth 3 times daily.       diphenoxylate-atropine (LOMOTIL) 2.5-0.025 MG per tablet Take 2 tablets by mouth 4 times daily as needed.       fenofibrate (TRICOR) 145 MG tablet Take 145 mg by mouth daily.       ferrous " sulfate 325 (65 FE) MG tablet Take 1 tablet by mouth daily.       Furosemide (LASIX) 20 MG tablet Take 2 tablets by mouth 2 times daily. 90 tablet 1     Furosemide (LASIX) 20 MG tablet Take  by mouth 2 times daily. Take 40 MG every AM and 20 MG every PM 90 tablet 11     gemfibrozil (LOPID) 600 MG tablet Take 600 mg by mouth 2 times daily (before meals).       glipiZIDE (GLUCOTROL) 10 MG tablet Take 10 mg by mouth 2 times daily (before meals).       Guaifenesin-Codeine (ROBITUSSIN A-C OR) Take 5 mLs by mouth every 6 hours as needed.       hydrocodone-acetaminophen (VICODIN) 5-500 MG per tablet Take 1 tablet by mouth 3 times daily as needed.       insulin aspart (NOVOLOG) injection  Inject  Subcutaneous See Admin Instructions.       insulin glargine (LANTUS SOLOSTAR) 100 UNIT/ML injection Inject 50 Units Subcutaneous At Bedtime.       isosorbide dinitrate (ISORDIL) 20 MG tablet Take 20 mg by mouth 3 times daily.       lisinopril (PRINIVIL,ZESTRIL) 20 MG tablet Take 20 mg by mouth daily.       loperamide (IMODIUM A-D) 2 MG tablet Take 3 tablets with first loose stool, then 1 tablet with each subsequent loose stool. Maximum of 8 tablets per 24 hours.       losartan (COZAAR) 100 MG tablet Take 100 mg by mouth daily.       MetFORmin (GLUCOPHAGE) 500 MG tablet Take 500 mg by mouth 2 times daily (with meals).       nitroGLYCERIN (NITROSTAT) 0.4 MG SL tablet Place 0.4 mg under the tongue every 5 minutes as needed.       omeprazole 20 MG tablet Take 2 tablets (40 mg) by mouth 2 times daily For 8 weeks. 60 tablet 1     [START ON 12/17/2018] omeprazole 20 MG tablet Take 2 tablets (40 mg) by mouth daily 90 tablet 3     pioglitazone (ACTOS) 15 MG tablet Take 15 mg by mouth daily.       prochlorperazine (COMPAZINE) 5 MG tablet Take 5-10 mg by mouth every 8 hours as needed.       raloxifene (EVISTA) 60 MG tablet Take 60 mg by mouth daily.       ranitidine (ZANTAC) 150 MG tablet Take 150 mg by mouth 2 times daily.       sertraline  "(ZOLOFT) 50 MG tablet Take 50 mg by mouth daily.       zolpidem (AMBIEN) 5 MG tablet Take 5 mg by mouth nightly as needed.         PAST SURGICAL HISTORY:  Past Surgical History:   Procedure Laterality Date     APPENDECTOMY       COLOSTOMY  1999     GYN SURGERY      csection x2     parastomal hernia repair[  1999       ALLERGIES:   No Known Allergies    FAMILY HISTORY:  No family history on file.      SOCIAL HISTORY:  Social History   Substance Use Topics     Smoking status: Never Smoker     Smokeless tobacco: Never Used     Alcohol use No       ROS:   Constitutional: No fever, chills, or sweats. Weight stable.   ENT: No visual disturbance, ear ache, epistaxis, sore throat.   Cardiovascular: As per HPI.   Respiratory: No cough, hemoptysis.    GI: No nausea, vomiting, hematemesis, melena, or hematochezia. Abdominal pain +  : No hematuria.   Integument: Negative.   Psychiatric: Negative.   Hematologic:  No easy bruising, no easy bleeding.  Neuro: Negative.   Endocrinology: No significant heat or cold intolerance   Musculoskeletal: No myalgia.    Exam:  /74 (BP Location: Right arm, Patient Position: Sitting, Cuff Size: Adult Large)  Pulse 74  Resp 20  Ht 1.499 m (4' 11\")  Wt 88.9 kg (196 lb)  SpO2 98%  BMI 39.59 kg/m2   The patient was examined while she was sitting in her wheelchair.  GENERAL APPEARANCE: alert and no distress  HEENT: no icterus, no central cyanosis  LYMPH/NECK: no adenopathy, no asymmetry, JVP not elevated in sitting position, no carotid bruits.  RESPIRATORY: lungs clear to auscultation - no rales, rhonchi or wheezes, no use of accessory muscles, no retractions, respirations are unlabored, normal respiratory rate  CARDIOVASCULAR: regular rhythm, normal S1, S2, no S3 or S4 and no murmur, click or rub, precordium quiet with normal PMI.  GI: soft, non tender  EXTREMITIES: peripheral pulses normal, bilateral 2+ pretibial edema.  NEURO: alert, normal speech,and affect  VASC: Radial, dorsalis " pedis and posterior tibialis pulses are normal in volumes and symmetric bilaterally.   SKIN: no ecchymoses, no rashes     I have reviewed the labs and personally reviewed the imaging below and made my comment in the assessment and plan.    Labs:  CBC RESULTS 5/2/2017 [facility]:   Hemoglobin 10.6  White blood cell 9.4K  Platelet 106K       BMP RESULTS 5/2/2017 [facility]:  Sodium 140  Potassium 4.1  Glucose 83  BUN 33  Creatinine 1.46    TSH 5/25/2016 2.96    Echocardiogram 2012 [facility]   Normal left ventricular size and systolic function, estimated ejection   fraction of 55-60%.    Left atrial enlargement.  Mild mitral regurgitation with mitral annular calcification.    Aortic valve sclerosis.  Mild tricuspid regurgitation with pulmonary hypertension with estimated   right ventricular systolic pressure of 40mmHg plus right atrial pressure.  Possible PFO.    EKG from today was completed while she was sitting in the chair.  Sinus rhythm otherwise unremarkable.    Assessment and Plan:   Ms. Maryse Cruz is a 88 year old  female with PMH significant for HTN, CAD, DM and CKD.    1.  Lower extremity edema with increased shortness of breath on exertion over the last few months: These symptoms might be due to heart failure with preserved ejection fraction.  She does not report PND or orthopnea since she is sleeping on a recliner.  I recommend a chest x-ray, BNP and transthoracic echocardiogram.  I recommended to increase furosemide to 40 mg in the a.m. and 20 mg in the p.m.  Recommended low-sodium diet.  Otherwise continue current medications.  I will see the patient in 1 month to review the results and evaluate lower extremity edema.    2.  Coronary artery disease: The patient has a remote history of stenting of RCA.  No recent angiogram.  She denies chest pain.    3.  Hypertension: Well-controlled.      4.  Anemia: I will defer the follow-up to primary care physician.  Hemoglobin was 10.7 from 2017 likely  due to CKD.    Furosemide dose increased to 40+20 mg.  Continue current medical treatment with;  Atenolol 50 mg  Isosorbide mononitrate 60 mg  Insulin  Inhalers    Return to clinic in 1 month.    A total of 30 minutes spent face-toface with greater than 50% of the time spent in cpunseling and coordinating cares of the issues above.       Abe Austin MD

## 2018-11-05 NOTE — MR AVS SNAPSHOT
After Visit Summary   11/5/2018    Maryse Cruz    MRN: [id number]           Patient Information     Date Of Birth          9/10/1930        Visit Information        Provider Department      11/5/2018 11:30 AM Abe Austin MD M [facility]        Today's Diagnoses     Coronary atherosclerosis    -  1    Chronic kidney disease, unspecified CKD stage        Localized edema        Hypertension, unspecified type        IDDM (insulin dependent diabetes mellitus) (H)          Care Instructions    You will be scheduled for a follow up visit: 1 month with Dr Austin  You will have an echocardiogram prior to this visit.     Medication changes:   -Increase furosemide to 40 mg in am  -Furosemide 20 mg at supper  - Low sodium diet    Testing Scheduled:   -Echocardiogram - with follow up visit  -Chest xray today  -Blood test today: pBNP  -Repeat basic metabolic panel (blood test) in 1 week    We encourage you to use My Chart as your primary form of communication if possible. If you need assistance in setting this up, please contact our office or ask at your follow up visit.     If you need a medication refill please contact your pharmacy. Please allow at least 3 business days for your refill to be completed.       Cardiology  Telephone Number    Heidi Christopher [phone]   Or send a message to your provider via my chart.   For scheduling procedures:    [facility]    Clinic appointments       [phone]   For the Device Clinic (Pacemakers and ICD's)   RN's :   Bonnie Carroll  During business hours: [phone]    After business hours:   [phone]- select option 4 and ask for job code [id number].          As always, Thank you for trusting us with your health care needs!          Follow-ups after your visit        Additional Services     Follow-Up with General Cardiologist                 Your next 10 appointments already scheduled     Dec 06, 2018 12:15 PM CST   Lab with   LAB    Health Lab ([facility])    [address]  1st Floor  [place] [address]   [phone]            Dec 06, 2018  1:15 PM CST   (Arrive by 12:45 PM)   New Patient Visit with Josh Archuleta MD   [facility] Nephrology ([facility])    [address]  [address]  [place] [address]   [phone]            Dec 17, 2018 11:00 AM CST   Ech Complete with UCECHCR2   [facility] Echo ([facility])    [address]  3rd Floor  [place] [address] [phone]           1.  Please bring or wear a comfortable two-piece outfit. 2.  You may eat, drink and take your normal medicines. 3.  For any questions that cannot be answered, please contact the ordering physician 4.  Please do not wear perfumes or scented lotions on the day of your exam.            Dec 17, 2018 12:30 PM CST   (Arrive by 12:15 PM)   Return Visit with Abe Austin MD   [facility] Heart Care ([facility])    [address]  [address]  [place] [address] [phone]              Future tests that were ordered for you today     Open Future Orders        Priority Expected Expires Ordered    Basic metabolic panel Routine 11/12/2018 2/3/2019 11/5/2018    Follow-Up with General Cardiologist Routine 12/3/2018 11/5/2019 11/5/2018    Echo Complete Routine 12/3/2018 2/3/2019 11/5/2018            Who to contact     If you have questions or need follow up information about today's clinic visit or your schedule please contact [facility] directly at [phone].  Normal or non-critical lab and imaging results will be communicated to you by MyChart, letter or phone within 4 business days after the clinic has received the results. If you do not hear from us within 7 days, please contact the clinic through MyChart or phone. If you have a critical or abnormal lab result, we will notify you by phone as soon as  "possible.  Submit refill requests through [employer] or call your pharmacy and they will forward the refill request to us. Please allow 3 business days for your refill to be completed.          Additional Information About Your Visit        [employer] Information     [employer] lets you send messages to your doctor, view your test results, renew your prescriptions, schedule appointments and more. To sign up, go to [email or web address] . Click on \"Log in\" on the left side of the screen, which will take you to the Welcome page. Then click on \"Sign up Now\" on the right side of the page.     You will be asked to enter the access code listed below, as well as some personal information. Please follow the directions to create your username and password.     Your access code is: [email or web address]  Expires: 2019  5:30 AM     Your access code will  in 90 days. If you need help or a new code, please call your [place] clinic or [phone].        Care EveryWhere ID     This is your Care EveryWhere ID. This could be used by other organizations to access your [place] medical records  [id number]        Your Vitals Were     Pulse Respirations Height Pulse Oximetry BMI (Body Mass Index)       74 20 1.499 m (4' 11\") 98% 39.59 kg/m2        Blood Pressure from Last 3 Encounters:   18 147/74   10/29/18 142/60   10/26/12 105/49    Weight from Last 3 Encounters:   18 88.9 kg (196 lb)   10/26/12 90.9 kg (200 lb 4.8 oz)   12 93.5 kg (206 lb 3.2 oz)              We Performed the Following     3 Gram Sodium Diet     EKG 12-lead, tracing only (Same Day)          Today's Medication Changes          These changes are accurate as of 18  1:40 PM.  If you have any questions, ask your nurse or doctor.               Start taking these medicines.        Dose/Directions    alum & mag hydroxide-simethicone 200-200-20 MG/5ML Susp suspension   Commonly known as:  MYLANTA/MAALOX   Used for:  Coronary atherosclerosis, Chronic " kidney disease, unspecified CKD stage, Localized edema, Hypertension, unspecified type, IDDM (insulin dependent diabetes mellitus) (H)   Started by:  Abe Austin MD        Dose:  15 mL   Take 15 mLs by mouth every 4 hours as needed for indigestion   Refills:  0       DULoxetine 30 MG EC capsule   Commonly known as:  CYMBALTA   Used for:  Coronary atherosclerosis, Chronic kidney disease, unspecified CKD stage, Localized edema, Hypertension, unspecified type, IDDM (insulin dependent diabetes mellitus) (H)   Started by:  Abe Austin MD        Dose:  30 mg   Take 1 capsule (30 mg) by mouth daily   Quantity:  60 capsule   Refills:  1       folic acid 1 MG tablet   Commonly known as:  FOLVITE   Used for:  Coronary atherosclerosis, Chronic kidney disease, unspecified CKD stage, Localized edema, Hypertension, unspecified type, IDDM (insulin dependent diabetes mellitus) (H)   Started by:  Abe Austin MD        Dose:  2 mg   Take 2 tablets (2 mg) by mouth daily   Quantity:  30 tablet   Refills:  0       gabapentin 300 MG capsule   Commonly known as:  NEURONTIN   Used for:  Coronary atherosclerosis, Chronic kidney disease, unspecified CKD stage, Localized edema, Hypertension, unspecified type, IDDM (insulin dependent diabetes mellitus) (H)   Started by:  Abe Austin MD        Dose:  300 mg   Take 1 capsule (300 mg) by mouth 3 times daily   Quantity:  90 capsule   Refills:  0       isosorbide mononitrate 60 MG 24 hr tablet   Commonly known as:  IMDUR   Used for:  Coronary atherosclerosis, Chronic kidney disease, unspecified CKD stage, Localized edema, Hypertension, unspecified type, IDDM (insulin dependent diabetes mellitus) (H)   Started by:  Abe Austin MD        Dose:  60 mg   Take 1 tablet (60 mg) by mouth daily   Quantity:  30 tablet   Refills:  0       magnesium oxide 400 MG tablet   Commonly known as:  MAG-OX   Used for:  Coronary atherosclerosis, Chronic kidney disease, unspecified CKD stage, Localized edema,  Hypertension, unspecified type, IDDM (insulin dependent diabetes mellitus) (H)   Started by:  Abe Austin MD        Dose:  400 mg   Take 1 tablet (400 mg) by mouth 2 times daily   Quantity:  7 tablet   Refills:  0       ondansetron 4 MG tablet   Commonly known as:  ZOFRAN   Used for:  Coronary atherosclerosis, Chronic kidney disease, unspecified CKD stage, Localized edema, Hypertension, unspecified type, IDDM (insulin dependent diabetes mellitus) (H)   Started by:  Abe Austin MD        Dose:  4 mg   Take 1 tablet (4 mg) by mouth every 6 hours as needed for nausea   Quantity:  18 tablet   Refills:  0       potassium chloride SA 20 MEQ CR tablet   Commonly known as:  K-DUR/KLOR-CON M   Used for:  Coronary atherosclerosis, Chronic kidney disease, unspecified CKD stage, Localized edema, Hypertension, unspecified type, IDDM (insulin dependent diabetes mellitus) (H)   Started by:  Abe Austin MD        Dose:  20 mEq   Take 1 tablet (20 mEq) by mouth 2 times daily   Quantity:  60 tablet   Refills:  0         These medicines have changed or have updated prescriptions.        Dose/Directions    furosemide 20 MG tablet   Commonly known as:  LASIX   This may have changed:    - how much to take  - how to take this  - when to take this  - additional instructions  - Another medication with the same name was removed. Continue taking this medication, and follow the directions you see here.   Used for:  Chronic kidney disease, unspecified CKD stage, Localized edema   Changed by:  Abe Austin MD        40 mg in am, 20 mg at supper   Quantity:  90 tablet   Refills:  1       omeprazole 20 MG tablet   This may have changed:    - additional instructions  - Another medication with the same name was removed. Continue taking this medication, and follow the directions you see here.   Used for:  Coronary atherosclerosis, Chronic kidney disease, unspecified CKD stage, Localized edema   Changed by:  Abe Austin MD        Dose:  40 mg    Take 2 tablets (40 mg) by mouth 2 times daily   Quantity:  60 tablet   Refills:  1         Stop taking these medicines if you haven't already. Please contact your care team if you have questions.     ACTOS 15 MG tablet   Generic drug:  pioglitazone   Stopped by:  Abe Austin MD           AMBIEN 5 MG tablet   Generic drug:  zolpidem   Stopped by:  Abe Austin MD           amitriptyline 25 MG tablet   Commonly known as:  ELAVIL   Stopped by:  Abe Austin MD           aspirin 325 MG tablet   Stopped by:  Abe Austin MD           bisacodyl 5 MG EC tablet   Commonly known as:  DULCOLAX   Stopped by:  Abe Austin MD           calcitRIOL 0.5 MCG capsule   Commonly known as:  ROCALTROL   Stopped by:  Abe Austin MD           COZAAR 100 MG tablet   Generic drug:  losartan   Stopped by:  Abe Austin MD           EVISTA 60 MG tablet   Generic drug:  raloxifene   Stopped by:  Abe Austin MD           GLUCOPHAGE 500 MG tablet   Generic drug:  metFORMIN   Stopped by:  Abe Austin MD           GLUCOTROL 10 MG tablet   Generic drug:  glipiZIDE   Stopped by:  Abe Austin MD           HYDROcodone-acetaminophen 5-500 MG per tablet   Commonly known as:  VICODIN   Stopped by:  Abe Austin MD           isosorbide dinitrate 20 MG tablet   Commonly known as:  ISORDIL   Stopped by:  Abe Austin MD           LIPITOR 10 MG tablet   Generic drug:  atorvastatin   Stopped by:  Abe Austin MD           lisinopril 20 MG tablet   Commonly known as:  PRINIVIL/ZESTRIL   Stopped by:  Abe Austin MD           LOMOTIL 2.5-0.025 MG per tablet   Generic drug:  diphenoxylate-atropine   Stopped by:  Abe Austin MD           loperamide 2 MG tablet   Commonly known as:  IMODIUM A-D   Stopped by:  Abe Austin MD           LOPID 600 MG tablet   Generic drug:  gemfibrozil   Stopped by:  Abe Austin MD           OSCAL 500/200 D-3 500-125 MG-UNIT Tabs   Generic drug:  calcium-vitamin D   Stopped by:  Abe Austin MD           prochlorperazine 5  MG tablet   Commonly known as:  COMPAZINE   Stopped by:  Abe Austin MD           ROBITUSSIN A-C OR   Stopped by:  bAe Austin MD           TRICOR 145 MG tablet   Generic drug:  fenofibrate   Stopped by:  Abe Austin MD           ZANTAC 150 MG tablet   Generic drug:  ranitidine   Stopped by:  Abe Austin MD           ZOLOFT 50 MG tablet   Generic drug:  sertraline   Stopped by:  Abe Austin MD                Where to get your medicines      Some of these will need a paper prescription and others can be bought over the counter.  Ask your nurse if you have questions.     You don't need a prescription for these medications     alum & mag hydroxide-simethicone 200-200-20 MG/5ML Susp suspension    DULoxetine 30 MG EC capsule    folic acid 1 MG tablet    furosemide 20 MG tablet    gabapentin 300 MG capsule    isosorbide mononitrate 60 MG 24 hr tablet    magnesium oxide 400 MG tablet    omeprazole 20 MG tablet    ondansetron 4 MG tablet    potassium chloride SA 20 MEQ CR tablet                Primary Care Provider Office Phone # Fax #    [facility] [phone]       [facility] [facility] [address]        Equal Access to Services     [facility] HospitalRANDE[facility] : Hadii ирина Thomas, waaxda lurae, qaybta kaalmanjula bolton, maddy andrew. So [facility] [phone].    ATENCIÓN: Si habla español, tiene a dyer disposición servicios gratnoemíos de asistencia lingüística. [facility] [phone].    We comply with applicable federal civil rights laws and [place] laws. We do not discriminate on the basis of race, color, national origin, age, disability, sex, sexual orientation, or gender identity.            Thank you!     Thank you for choosing [facility]  for your care. Our goal is always to provide you with excellent care. Hearing back from our patients is one way we can continue to improve our services. Please take a few minutes to complete the written survey  that you may receive in the mail after your visit with us. Thank you!             Your Updated Medication List - Protect others around you: Learn how to safely use, store and throw away your medicines at www.disposemymeds.org.          This list is accurate as of 11/5/18  1:40 PM.  Always use your most recent med list.                   Brand Name Dispense Instructions for use Diagnosis    albuterol (2.5 MG/3ML) 0.083% neb solution      Take 1 ampule by nebulization every 4 hours as needed.        allopurinol 300 MG tablet    ZYLOPRIM     Take 150 mg by mouth daily.        alum & mag hydroxide-simethicone 200-200-20 MG/5ML Susp suspension    MYLANTA/MAALOX     Take 15 mLs by mouth every 4 hours as needed for indigestion    Coronary atherosclerosis, Chronic kidney disease, unspecified CKD stage, Localized edema, Hypertension, unspecified type, IDDM (insulin dependent diabetes mellitus) (H)       atenolol 50 MG tablet    TENORMIN     Take 50 mg by mouth daily.        DULoxetine 30 MG EC capsule    CYMBALTA    60 capsule    Take 1 capsule (30 mg) by mouth daily    Coronary atherosclerosis, Chronic kidney disease, unspecified CKD stage, Localized edema, Hypertension, unspecified type, IDDM (insulin dependent diabetes mellitus) (H)       fentaNYL    DURAGESIC     Place 1 each onto the skin every 3 days 12 mcg/hr patch every 72 hour in am        ferrous sulfate 325 (65 Fe) MG tablet    IRON     Take 1 tablet by mouth 3 times daily        folic acid 1 MG tablet    FOLVITE    30 tablet    Take 2 tablets (2 mg) by mouth daily    Coronary atherosclerosis, Chronic kidney disease, unspecified CKD stage, Localized edema, Hypertension, unspecified type, IDDM (insulin dependent diabetes mellitus) (H)       furosemide 20 MG tablet    LASIX    90 tablet    40 mg in am, 20 mg at supper    Chronic kidney disease, unspecified CKD stage, Localized edema       gabapentin 300 MG capsule    NEURONTIN    90 capsule    Take 1 capsule (300  mg) by mouth 3 times daily    Coronary atherosclerosis, Chronic kidney disease, unspecified CKD stage, Localized edema, Hypertension, unspecified type, IDDM (insulin dependent diabetes mellitus) (H)       isosorbide mononitrate 60 MG 24 hr tablet    IMDUR    30 tablet    Take 1 tablet (60 mg) by mouth daily    Coronary atherosclerosis, Chronic kidney disease, unspecified CKD stage, Localized edema, Hypertension, unspecified type, IDDM (insulin dependent diabetes mellitus) (H)       LANTUS SOLOSTAR 100 UNIT/ML injection   Generic drug:  insulin glargine      Inject 50 Units Subcutaneous At Bedtime.        magnesium oxide 400 MG tablet    MAG-OX    7 tablet    Take 1 tablet (400 mg) by mouth 2 times daily    Coronary atherosclerosis, Chronic kidney disease, unspecified CKD stage, Localized edema, Hypertension, unspecified type, IDDM (insulin dependent diabetes mellitus) (H)       NITROSTAT 0.4 MG sublingual tablet   Generic drug:  nitroGLYcerin      Place 0.4 mg under the tongue every 5 minutes as needed.        NovoLOG VIAL 100 UNITS/ML injection   Generic drug:  insulin aspart      Inject  Subcutaneous See Admin Instructions.        omeprazole 20 MG tablet     60 tablet    Take 2 tablets (40 mg) by mouth 2 times daily    Coronary atherosclerosis, Chronic kidney disease, unspecified CKD stage, Localized edema       ondansetron 4 MG tablet    ZOFRAN    18 tablet    Take 1 tablet (4 mg) by mouth every 6 hours as needed for nausea    Coronary atherosclerosis, Chronic kidney disease, unspecified CKD stage, Localized edema, Hypertension, unspecified type, IDDM (insulin dependent diabetes mellitus) (H)       potassium chloride SA 20 MEQ CR tablet    K-DUR/KLOR-CON M    60 tablet    Take 1 tablet (20 mEq) by mouth 2 times daily    Coronary atherosclerosis, Chronic kidney disease, unspecified CKD stage, Localized edema, Hypertension, unspecified type, IDDM (insulin dependent diabetes mellitus) (H)       TYLENOL 325 MG tablet    Generic drug:  acetaminophen      Take 2 tablets by mouth 3 times daily.

## 2018-11-05 NOTE — PATIENT INSTRUCTIONS
You will be scheduled for a follow up visit: 1 month with Dr Austin  You will have an echocardiogram prior to this visit.     Medication changes:   -Increase furosemide to 40 mg in am  -Furosemide 20 mg at supper  - Low sodium diet    Testing Scheduled:   -Echocardiogram - with follow up visit  -Chest xray today  -Blood test today: pBNP  -Repeat basic metabolic panel (blood test) in 1 week    We encourage you to use My Chart as your primary form of communication if possible. If you need assistance in setting this up, please contact our office or ask at your follow up visit.     If you need a medication refill please contact your pharmacy. Please allow at least 3 business days for your refill to be completed.       Cardiology  Telephone Number    Heidi Christopher [phone]   Or send a message to your provider via my chart.   For scheduling procedures:    [facility]    Clinic appointments       [phone]   For the Device Clinic (Pacemakers and ICD's)   RN's :   Bonnie Carroll  During business hours: [phone]    After business hours:   [phone]- select option 4 and ask for job code [id number].          As always, Thank you for trusting us with your health care needs!

## 2018-11-06 LAB — INTERPRETATION ECG - MUSE: NORMAL

## 2018-12-05 DIAGNOSIS — N18.30 CKD (CHRONIC KIDNEY DISEASE) STAGE 3, GFR 30-59 ML/MIN (H): Primary | ICD-10-CM
